# Patient Record
Sex: FEMALE | Race: WHITE | NOT HISPANIC OR LATINO | URBAN - METROPOLITAN AREA
[De-identification: names, ages, dates, MRNs, and addresses within clinical notes are randomized per-mention and may not be internally consistent; named-entity substitution may affect disease eponyms.]

---

## 2017-01-12 ENCOUNTER — HOSPITAL ENCOUNTER (OUTPATIENT)
Dept: PHYSICAL MEDICINE AND REHAB | Facility: CLINIC | Age: 67
Discharge: HOME | End: 2017-01-12
Attending: PHYSICIAN ASSISTANT

## 2017-01-12 DIAGNOSIS — M51.26 LUMBAR DISC HERNIATION: ICD-10-CM

## 2017-01-12 DIAGNOSIS — M79.18 MYOFASCIAL PAIN: ICD-10-CM

## 2017-01-12 DIAGNOSIS — M54.50 LUMBALGIA: ICD-10-CM

## 2017-01-12 DIAGNOSIS — G47.9 SLEEP DISTURBANCE: ICD-10-CM

## 2017-01-12 DIAGNOSIS — M54.6 THORACIC BACK PAIN: ICD-10-CM

## 2017-01-12 DIAGNOSIS — M54.16 LUMBAR RADICULITIS: ICD-10-CM

## 2017-01-12 DIAGNOSIS — M54.16 LUMBAR RADICULAR PAIN: ICD-10-CM

## 2017-01-19 ENCOUNTER — HOSPITAL ENCOUNTER (OUTPATIENT)
Dept: PHYSICAL MEDICINE AND REHAB | Facility: CLINIC | Age: 67
Discharge: HOME | End: 2017-01-19
Attending: PHYSICIAN ASSISTANT

## 2017-01-19 DIAGNOSIS — M40.14 OTHER SECONDARY KYPHOSIS, THORACIC REGION: ICD-10-CM

## 2017-01-19 DIAGNOSIS — M99.03 LUMBOSACRAL DYSFUNCTION: ICD-10-CM

## 2017-01-19 DIAGNOSIS — M51.26 LUMBAR DISC HERNIATION: ICD-10-CM

## 2017-01-19 DIAGNOSIS — M99.02 THORACIC SEGMENT DYSFUNCTION: ICD-10-CM

## 2017-01-19 DIAGNOSIS — M79.18 MYOFASCIAL PAIN: ICD-10-CM

## 2017-01-24 ENCOUNTER — HOSPITAL ENCOUNTER (OUTPATIENT)
Dept: PHYSICAL MEDICINE AND REHAB | Facility: CLINIC | Age: 67
Discharge: HOME | End: 2017-01-24
Attending: PHYSICIAN ASSISTANT

## 2017-01-24 DIAGNOSIS — M99.03 LUMBOSACRAL DYSFUNCTION: ICD-10-CM

## 2017-01-24 DIAGNOSIS — M40.14 OTHER SECONDARY KYPHOSIS, THORACIC REGION: ICD-10-CM

## 2017-01-24 DIAGNOSIS — M79.18 MYOFASCIAL PAIN: ICD-10-CM

## 2017-01-24 DIAGNOSIS — M99.02 THORACIC SEGMENT DYSFUNCTION: ICD-10-CM

## 2017-01-31 ENCOUNTER — HOSPITAL ENCOUNTER (OUTPATIENT)
Dept: PHYSICAL MEDICINE AND REHAB | Facility: CLINIC | Age: 67
Discharge: HOME | End: 2017-01-31
Attending: PHYSICIAN ASSISTANT

## 2017-01-31 DIAGNOSIS — M40.14 OTHER SECONDARY KYPHOSIS, THORACIC REGION: ICD-10-CM

## 2017-01-31 DIAGNOSIS — M79.18 MYOFASCIAL PAIN: ICD-10-CM

## 2017-01-31 DIAGNOSIS — M99.02 THORACIC SEGMENT DYSFUNCTION: ICD-10-CM

## 2017-01-31 DIAGNOSIS — M99.03 LUMBOSACRAL DYSFUNCTION: ICD-10-CM

## 2017-01-31 ASSESSMENT — MIFFLIN-ST. JEOR: SCORE: 1164.99

## 2017-02-09 ENCOUNTER — HOSPITAL ENCOUNTER (OUTPATIENT)
Dept: PHYSICAL MEDICINE AND REHAB | Facility: CLINIC | Age: 67
Discharge: HOME | End: 2017-02-09
Attending: PHYSICIAN ASSISTANT

## 2017-02-09 DIAGNOSIS — M99.02 THORACIC SEGMENT DYSFUNCTION: ICD-10-CM

## 2017-02-09 DIAGNOSIS — M99.03 LUMBOSACRAL DYSFUNCTION: ICD-10-CM

## 2017-02-09 DIAGNOSIS — M79.18 MYOFASCIAL PAIN: ICD-10-CM

## 2017-02-09 DIAGNOSIS — M41.9 SCOLIOSIS: ICD-10-CM

## 2017-02-16 ENCOUNTER — HOSPITAL ENCOUNTER (OUTPATIENT)
Dept: PHYSICAL MEDICINE AND REHAB | Facility: CLINIC | Age: 67
Discharge: HOME | End: 2017-02-16
Attending: PHYSICIAN ASSISTANT

## 2017-02-16 DIAGNOSIS — M99.02 THORACIC SEGMENT DYSFUNCTION: ICD-10-CM

## 2017-02-16 DIAGNOSIS — M99.03 LUMBOSACRAL DYSFUNCTION: ICD-10-CM

## 2017-02-16 DIAGNOSIS — M79.18 MYOFASCIAL PAIN: ICD-10-CM

## 2017-02-16 DIAGNOSIS — M41.9 SCOLIOSIS: ICD-10-CM

## 2017-02-23 ENCOUNTER — HOSPITAL ENCOUNTER (OUTPATIENT)
Dept: PHYSICAL MEDICINE AND REHAB | Facility: CLINIC | Age: 67
Discharge: HOME | End: 2017-02-23
Attending: PHYSICIAN ASSISTANT

## 2017-02-23 DIAGNOSIS — M99.02 THORACIC SEGMENT DYSFUNCTION: ICD-10-CM

## 2017-02-23 DIAGNOSIS — M99.03 LUMBOSACRAL DYSFUNCTION: ICD-10-CM

## 2017-02-23 DIAGNOSIS — M41.9 SCOLIOSIS: ICD-10-CM

## 2017-03-02 ENCOUNTER — HOSPITAL ENCOUNTER (OUTPATIENT)
Dept: PHYSICAL MEDICINE AND REHAB | Facility: CLINIC | Age: 67
Discharge: HOME | End: 2017-03-02
Attending: PHYSICIAN ASSISTANT

## 2017-03-02 DIAGNOSIS — M99.03 LUMBOSACRAL DYSFUNCTION: ICD-10-CM

## 2017-03-02 DIAGNOSIS — M41.9 SCOLIOSIS: ICD-10-CM

## 2017-03-02 ASSESSMENT — MIFFLIN-ST. JEOR: SCORE: 1209.9

## 2017-03-24 ENCOUNTER — COMMUNICATION - HEALTHEAST (OUTPATIENT)
Dept: PHYSICAL MEDICINE AND REHAB | Facility: CLINIC | Age: 67
End: 2017-03-24

## 2017-03-24 DIAGNOSIS — M79.18 MYOFASCIAL PAIN: ICD-10-CM

## 2017-03-24 DIAGNOSIS — M54.16 LUMBAR RADICULITIS: ICD-10-CM

## 2017-03-24 DIAGNOSIS — M54.50 LUMBALGIA: ICD-10-CM

## 2017-03-24 DIAGNOSIS — M54.16 LUMBAR RADICULAR PAIN: ICD-10-CM

## 2017-03-24 DIAGNOSIS — M51.26 LUMBAR DISC HERNIATION: ICD-10-CM

## 2017-03-24 DIAGNOSIS — G47.9 SLEEP DISTURBANCE: ICD-10-CM

## 2017-04-03 ENCOUNTER — COMMUNICATION - HEALTHEAST (OUTPATIENT)
Dept: PHYSICAL MEDICINE AND REHAB | Facility: CLINIC | Age: 67
End: 2017-04-03

## 2017-04-03 DIAGNOSIS — M54.16 LUMBAR RADICULITIS: ICD-10-CM

## 2017-04-05 ENCOUNTER — HOSPITAL ENCOUNTER (OUTPATIENT)
Dept: PHYSICAL MEDICINE AND REHAB | Facility: CLINIC | Age: 67
Discharge: HOME | End: 2017-04-05
Attending: PHYSICIAN ASSISTANT

## 2017-04-05 DIAGNOSIS — M54.16 LUMBAR RADICULITIS: ICD-10-CM

## 2017-04-20 ENCOUNTER — HOSPITAL ENCOUNTER (OUTPATIENT)
Dept: PHYSICAL MEDICINE AND REHAB | Facility: CLINIC | Age: 67
Discharge: HOME | End: 2017-04-20
Attending: PHYSICIAN ASSISTANT

## 2017-04-20 ENCOUNTER — AMBULATORY - HEALTHEAST (OUTPATIENT)
Dept: PHYSICAL MEDICINE AND REHAB | Facility: CLINIC | Age: 67
End: 2017-04-20

## 2017-04-20 DIAGNOSIS — M54.50 LUMBALGIA: ICD-10-CM

## 2017-04-20 DIAGNOSIS — M54.6 THORACIC BACK PAIN: ICD-10-CM

## 2017-04-20 DIAGNOSIS — M51.26 LUMBAR DISC HERNIATION: ICD-10-CM

## 2017-04-20 DIAGNOSIS — M54.16 LUMBAR RADICULITIS: ICD-10-CM

## 2017-04-20 DIAGNOSIS — M79.18 MYOFASCIAL PAIN: ICD-10-CM

## 2017-04-21 ENCOUNTER — COMMUNICATION - HEALTHEAST (OUTPATIENT)
Dept: NEUROSURGERY | Facility: CLINIC | Age: 67
End: 2017-04-21

## 2019-03-20 ENCOUNTER — RECORDS - HEALTHEAST (OUTPATIENT)
Dept: ADMINISTRATIVE | Facility: OTHER | Age: 69
End: 2019-03-20

## 2020-06-03 ENCOUNTER — APPOINTMENT (OUTPATIENT)
Age: 70
Setting detail: DERMATOLOGY
End: 2020-06-05

## 2020-06-03 VITALS — RESPIRATION RATE: 18 BRPM | HEIGHT: 62 IN | WEIGHT: 130 LBS

## 2020-06-03 DIAGNOSIS — R21 RASH AND OTHER NONSPECIFIC SKIN ERUPTION: ICD-10-CM

## 2020-06-03 PROCEDURE — OTHER PRESCRIPTION: OTHER

## 2020-06-03 PROCEDURE — OTHER COUNSELING: OTHER

## 2020-06-03 PROCEDURE — 99202 OFFICE O/P NEW SF 15 MIN: CPT

## 2020-06-03 RX ORDER — KETOCONAZOLE 20 MG/ML
2% SHAMPOO TOPICAL
Qty: 1 | Refills: 1 | Status: ERX | COMMUNITY
Start: 2020-06-03

## 2020-06-03 RX ORDER — DESONIDE 0.5 MG/G
.05% CREAM TOPICAL BID
Qty: 2 | Refills: 1 | Status: ERX | COMMUNITY
Start: 2020-06-03

## 2020-06-08 ENCOUNTER — RX ONLY (RX ONLY)
Age: 70
End: 2020-06-08

## 2020-06-08 RX ORDER — HYDROCORTISONE 25 MG/G
2.5% CREAM TOPICAL TWICE A DAY
Qty: 1 | Refills: 0 | Status: ERX | COMMUNITY
Start: 2020-06-08

## 2021-05-30 VITALS — WEIGHT: 131 LBS | BODY MASS INDEX: 19.92 KG/M2

## 2021-05-30 VITALS — BODY MASS INDEX: 19.92 KG/M2 | WEIGHT: 131 LBS

## 2021-05-30 VITALS — BODY MASS INDEX: 19.8 KG/M2 | WEIGHT: 130.2 LBS

## 2021-05-30 VITALS — BODY MASS INDEX: 19.76 KG/M2 | HEIGHT: 68 IN | WEIGHT: 130.4 LBS

## 2021-05-30 VITALS — WEIGHT: 130 LBS | BODY MASS INDEX: 19.77 KG/M2

## 2021-05-30 VITALS — BODY MASS INDEX: 19.77 KG/M2 | WEIGHT: 130 LBS

## 2021-05-30 VITALS — BODY MASS INDEX: 21.26 KG/M2 | WEIGHT: 140.3 LBS | HEIGHT: 68 IN

## 2021-05-30 VITALS — WEIGHT: 140 LBS | BODY MASS INDEX: 21.29 KG/M2

## 2021-06-08 NOTE — PROGRESS NOTES
SUBJECTIVE:   Ida Hopkins is a 66 y.o. female [facility]. Patient has 5/10 constant achy thoracic and lumbar pain  without leg pain, today.  Patient has had a right L5-S1 TFESI for right lumbar radiculopathy which she does no longer have.  Now her pain is more center in her right side of her thoracic spine.  He is a patient of EPI Dowd, MPA.      PROCEDURE:  66 y.o. female with thoracic and lumbosacral somatic dysfunctions.      PRE-PROCEDURE DIAGNOSIS: Myofascial pain, thoracic and lumbar pain, kyphosis      PROCEDURE PERFORMED: joint manipulations.      Brief Procedure: The patient understands the risks and benefits of spinal manipulations      Procedure:  The patient has pain at thoracic and lumbosacral region.  TE 6, T8, T9, T10, L1, L3, L5, and right SI joint malrotated which are determined by static and motion palpations. Patient was placed on table prone. The thoracic and lumbosacral were manipulated by mechanical instrument and F/D table. X-Y axial distraction of L1-L2 and L2-L3 using category 2. The patient tolerated the manipulations, distraction without any complications.    DIAGNOSIS:  Diagnoses and all orders for this visit:    Myofascial pain    Thoracic segment dysfunction    Lumbosacral dysfunction    Lumbar disc herniation    Other secondary kyphosis, thoracic region        DISCUSSION/PLAN:  Patient condition is unchanged. Patient has myofascial pain, thoracic and lumbar pain, kyphosis. 3-4 regions were manipulated. Distracted L1-L2 and L2-L3 levels.  I recommend the patient to a 2-3 weeks trial of spinal manipulation.    Plan:    Home instructions: ice QID for 10-15 minutes.    Patient will follow up next week for spinal manipulations.    Frequency: 2x weeks for 2-3 weeks        Ihsan Giron DC, MSPAS, PA-C

## 2021-06-08 NOTE — PROGRESS NOTES
Assessment / Plan:     Diagnoses and all orders for this visit:    Lumbar radicular pain    Lumbalgia    Lumbar radiculitis    Lumbar disc herniation    Myofascial pain    Sleep disturbance    Thoracic back pain          Ida Hopkins is a 66 y.o. y.o. female with past medical history significant for end-stage renal disease, restless leg syndrome who presents today for follow-up regarding chronic mid low back pain radiating to the right lateral thigh, right lateral calf and the right ankle.  Patient reports significant improvement in her symptoms after receiving right L5-S1 transforaminal epidural steroid injection on December 15, 2016.  She is to report right Myofacial pain at the right thoracic/lumbar region.  Recommend patient to follow-up with Dr. Giron for Ts.     A shared decision making plan was used.  The patient's values and choices were respected.  The following represents what was discussed and decided upon by the physician and the patient.      1.  DIAGNOSTIC TESTS:  No imaging to review today.  2.  PHYSICAL THERAPY: Patient will continue on physical therapy.  3.  MEDICATIONS:  She will continue on gabapentin.    4.  INTERVENTIONS:  No therapeutic injections at this time.    5.  PATIENT EDUCATION:  I thoroughly discussed the plan with the patient today.  She verbalizes understanding and agrees with the plan.  6.  FOLLOW-UP: I'll see her back in 7-8 weeks or sooner if needed.  All questions were answered.    Subjective:     Ida Hopkins is a 66 y.o. female who presents today for follow-up regarding chronic mid low back pain radiating to the right lateral thigh, right lateral calf and right ankle.  Today patient reports significant improvement in her symptoms after receiving right L5-S1 transforaminal epidural steroid injection on December 15, 2016.  She still reports right upper lumbar/thoracic pain.  She has been taking gabapentin 300 mg, 1 tablet 3 times a day.  She hasn't been doing physical  exercises for the lower back.Patient denies urinary or bowel incontinence, unintentional weight loss, saddle anesthesia, fever or chills, balance difficulties.    Review of Systems:  Right-sided low back pain. Patient denies urinary or bowel incontinence, unintentional weight loss, saddle anesthesia, fever or chills, balance difficulties.       Objective:   CONSTITUTIONAL:  Vital signs as above.  No acute distress.  The patient is well nourished and well groomed.    PSYCHIATRIC:  The patient is awake, alert, oriented to person, place and time.  The patient is answering questions appropriately with clear speech.  Normal affect.  SKIN:  Skin over the face, posterior torso, bilateral upper and lower extremities is clean, dry, intact without rashes.  MUSCULOSKELETAL:  Gait is non-antalgic.  The patient is able to heel and toe walk without any difficulty.  Mild tenderness over the right T12, L1, L2 lumbar paraspinal muscles.      The patient has 5/5 strength for the bilateral hip flexors, knee flexors/extensors, ankle dorsiflexors/plantar flexors, ankle evertors/invertors.    NEUROLOGICAL:  2/4 patellar, medial hamstring, achilles reflexes which are symmetric bilaterally.  No ankle clonus bilaterally.  Sensation to light touch is intact in the bilateral L4, L5, and S1 dermatomes.       RESULTS:    Number MRI done at [facility] of neurology:      lumbar MRI done on July 21 of 2016 that shows at L5-S1 prominent right neural foraminal stenosis from a combination of vertical foraminal height loss and inferior foraminal disc distention with flattening of the exiting right L5 nerve roots. At the L4-L5 circumferential disc bulging with moderate size central disc protrusion, identing the ventral thecal sac. Septal superior endplate compression deformity of the L1 vertebrae, without appreciable vertebral height loss or retropulsion.

## 2021-06-08 NOTE — PROGRESS NOTES
SUBJECTIVE:   Ida Hopkins is a 66 y.o. female is [facility]. Patient has no upper and lower back pain, today.  She reports that she feels a little soreness in her back.  She reports that she cannot come twice a week.    PROCEDURE:  66 y.o. female with thoracic and lumbosacral somatic dysfunctions.      PRE-PROCEDURE DIAGNOSIS: Myofascial pain, thoracic and lumbar pain, kyphosis      PROCEDURE PERFORMED: joint manipulations.      Brief Procedure: The patient understands the risks and benefits of spinal manipulations      Procedure:  The patient has pain at thoracic and lumbosacral region.  T3, T6, T9, T10, L2, L4, L5, bilateral sacral base and right SI joint malrotated which are determined by static and motion palpations. Patient was placed on table prone. The thoracic and lumbosacral were manipulated by mechanical instrument and F/D table. X-Y axial distraction of L1-L2 and L2-L3 using category 2. The patient tolerated the manipulations, distraction without any complications.    DIAGNOSIS:  Diagnoses and all orders for this visit:    Myofascial pain    Thoracic segment dysfunction    Lumbosacral dysfunction    Other secondary kyphosis, thoracic region      DISCUSSION/PLAN:  Patient condition is improving. Patient has myofascial pain, thoracic and lumbar pain, kyphosis. 3-4 regions were manipulated. Distracted L1-L2 and L2-L3 levels.  She will come once a week due to scheduling.    Plan:    Home instructions: ice QID for 10-15 minutes.    Patient will follow up next week for spinal manipulations.    Frequency: 5 more appointments        Ihsan Giron DC, MSPAS, PA-C

## 2021-06-09 NOTE — PROGRESS NOTES
Assessment / Plan:     Diagnoses and all orders for this visit:    Scoliosis    Lumbosacral dysfunction          Ida Hopkins is a 66 y.o. y.o. female with past medical history significant for end-stage renal disease, restless leg syndrome who presents today for follow-up regarding chronic mid low back pain radiating to the right lateral thigh, right lateral calf and the right ankle.  The patient stated that she still has no low back pain however she noticed reappearance of anterior right shin pain after stopping gabapentin for the last 2 weeks.  Patient was concerned about dizziness as side effects of gabapentin.  Patient is gone through an extensive workup at he Neurologist and Cardiologist for her dizziness.  I encouraged patient to restart gabapentin in 2 weeks from now if she does not feel any change in her dizziness to try to help with the right anterior shin pain.  Patient verbalizes understanding and agrees with the plan.  She will follow up with Dr. Giron if needed    A shared decision making plan was used.  The patient's values and choices were respected.  The following represents what was discussed and decided upon by the physician and the patient.      1.  DIAGNOSTIC TESTS:  No imaging to review today.  2.  PHYSICAL THERAPY: She'll continue on physical therapy.  3.  MEDICATIONS:  She will restart gabapentin in 2 weeks if she does not feel any change in her dizziness symptoms.    4.  INTERVENTIONS:  Patient will contact us if she noticed reappearance of the low back pain and radicular pain in the right lower extremity and if she needs to repeat the therapeutic surgery injection.    5.  PATIENT EDUCATION:  I thoroughly discussed the plan with the patient today.  She verbalizes understanding and agrees with the plan.  6.  FOLLOW-UP: She'll follow up with me on an as-needed basis.  All questions were answered.    Subjective:     Ida Hopkins is a 66 y.o. female who presents today for follow-up  regarding chronic mid low back pain radiating to the right lateral thigh, right lateral calf and right ankle.  Today patient stated that she has been doing very well without radicular pain to the lower extremity hole until she stopped taking gabapentin 2 weeks ago where she noticed reappearance of anterior shin pain.  She stated that she has been having difficulty with dizziness.  She has been following up with her neurologist and cardiologist for this issue.  She stated that she stop taking gabapentin 2 weeks ago without any change in her dizziness.  She believes that is not related to taking gabapentin.  Today she reports 0 out of 10 intensity pain of the lower back again with minimal pain at the anterior right shin.  Her symptoms are aggravated by walking for long distances, working at home and rates it at 4 out of 10 intensity on its worst.  Patient stated that she takes Tylenol 500 mg 1-2 tablets when necessary for pain not every day.  She's not taking Celebrex anymore.aku      Review of Systems:  History of low back pain.  Minimal pain at the right anterior shin.Patient denies urinary or bowel incontinence, unintentional weight loss, saddle anesthesia, fever or chills, balance difficulties.       Objective:   CONSTITUTIONAL:  Vital signs as above.  No acute distress.  The patient is well nourished and well groomed.    PSYCHIATRIC:  The patient is awake, alert, oriented to person, place and time.  The patient is answering questions appropriately with clear speech.  Normal affect.  SKIN:  Skin over the face, posterior torso, bilateral upper and lower extremities is clean, dry, intact without rashes.  MUSCULOSKELETAL:  Gait is non-antalgic.  The patient is able to heel and toe walk without any difficulty.  No  tenderness over the L4-L5 lumbar paraspinal muscles.      The patient has 5/5 strength for the bilateral hip flexors, knee flexors/extensors, ankle dorsiflexors/plantar flexors, ankle evertors/invertors.     NEUROLOGICAL:  2/4 patellar, medial hamstring, achilles reflexes which are symmetric bilaterally.  No ankle clonus bilaterally.  Sensation to light touch is intact in the bilateral L4, L5, and S1 dermatomes.       RESULTS:      Number MRI done at [facility] of neurology:      lumbar MRI done on July 21 of 2016 that shows at L5-S1 prominent right neural foraminal stenosis from a combination of vertical foraminal height loss and inferior foraminal disc distention with flattening of the exiting right L5 nerve roots. At the L4-L5 circumferential disc bulging with moderate size central disc protrusion, identing the ventral thecal sac. Septal superior endplate compression deformity of the L1 vertebrae, without appreciable vertebral height loss or retropulsion.

## 2021-06-09 NOTE — PROGRESS NOTES
DISCUSSION/PLAN:  This is a 65 y/o who [facility].  She has slight scoliosis that was causing some pain in her thoracic and lumbar.  Patient has improved with spinal manipulation where she does not have any pain in her back.  Patient can progress to as-needed basis if she has any pain in the future. 3-4  regions were manipulated. Distracted L1-L2 and L2-L3 levels.    Home instructions: heat QID for 10-15 minutes.    Patient will follow up with EPI Neil, NEVILLE.    Frequency: Spinal manipulation as needed basis    Ihsan Giron DC, ANIBAL, PA-C        SUBJECTIVE:   Ida Hopkins is a 66 y.o. female is [facility]. Patient has no thoracic or lumbar pain, today.  This is her last spinal manipulation.  She states that she feels better since the initial spinal manipulation.  She does have some slight pain intermittently but is is better than for she start the treatment.  Pain is better with standing, walking and sitting.  She does not have any radicular leg pain.  She is scheduled to see EPI Dowd, NEVILLE.     OBJECTIVE:  Visit Vitals     BP 97/60     Pulse 87     Wt 131 lb (59.4 kg)     SpO2 94%     BMI 19.92 kg/m2     Physical Exam   Constitutional: She appears well-developed.   HENT:   Head: Atraumatic.   Eyes: Conjunctivae are normal.   Pulmonary/Chest: Effort normal.   Musculoskeletal:        Thoracic back: She exhibits deformity.        Lumbar back: She exhibits no pain and no spasm.   Neurological: She is alert.   Skin: Skin is warm and dry.   Psychiatric: She has a normal mood and affect.     DIAGNOSIS:  Diagnoses and all orders for this visit:    Scoliosis    Lumbosacral dysfunction    Thoracic segment dysfunction        PROCEDURE:  66 y.o. female with thoracic and lumbosacral somatic dysfunctions.      PRE-PROCEDURE DIAGNOSIS: scoliosis      PROCEDURE PERFORMED: joint manipulations.      Brief Procedure: The patient  understands the risks and benefits of spinal manipulations      Procedure:  The patient has no pain at thoracic and lumbosacral region.  T4, T6, T8, T9, T10, T12, L2, L3, L5 and right SI joint malrotated which are determined by static and motion palpations. Patient was placed on table prone. The thoracic and lumbosacral were manipulated by mechanical instrument and F/D table. X-Y axial distraction of L1-L2 and L2-L3 using category 2. The patient tolerated the manipulations, distraction without any complications.

## 2021-06-10 NOTE — PROGRESS NOTES
Assessment / Plan:     Diagnoses and all orders for this visit:    Lumbalgia  -     Ambulatory referral to Neurosurgery  -     traMADol (ULTRAM) 50 mg tablet; Take 1 tablet (50 mg total) by mouth every 6 (six) hours as needed for pain.  Dispense: 18 tablet; Refill: 0  -     celecoxib (CELEBREX) 100 MG capsule; Take 1 capsule (100 mg total) by mouth 2 (two) times a day.  Dispense: 60 capsule; Refill: 0    Lumbar radiculitis  -     Ambulatory referral to Neurosurgery  -     traMADol (ULTRAM) 50 mg tablet; Take 1 tablet (50 mg total) by mouth every 6 (six) hours as needed for pain.  Dispense: 18 tablet; Refill: 0  -     celecoxib (CELEBREX) 100 MG capsule; Take 1 capsule (100 mg total) by mouth 2 (two) times a day.  Dispense: 60 capsule; Refill: 0    Lumbar disc herniation  -     Ambulatory referral to Neurosurgery  -     traMADol (ULTRAM) 50 mg tablet; Take 1 tablet (50 mg total) by mouth every 6 (six) hours as needed for pain.  Dispense: 18 tablet; Refill: 0  -     celecoxib (CELEBREX) 100 MG capsule; Take 1 capsule (100 mg total) by mouth 2 (two) times a day.  Dispense: 60 capsule; Refill: 0    Thoracic back pain  -     Ambulatory referral to Neurosurgery  -     traMADol (ULTRAM) 50 mg tablet; Take 1 tablet (50 mg total) by mouth every 6 (six) hours as needed for pain.  Dispense: 18 tablet; Refill: 0  -     celecoxib (CELEBREX) 100 MG capsule; Take 1 capsule (100 mg total) by mouth 2 (two) times a day.  Dispense: 60 capsule; Refill: 0    Myofascial pain  -     Ambulatory referral to Neurosurgery  -     traMADol (ULTRAM) 50 mg tablet; Take 1 tablet (50 mg total) by mouth every 6 (six) hours as needed for pain.  Dispense: 18 tablet; Refill: 0  -     celecoxib (CELEBREX) 100 MG capsule; Take 1 capsule (100 mg total) by mouth 2 (two) times a day.  Dispense: 60 capsule; Refill: 0          Ida Hopkins is a 66 y.o. y.o. female with past medical history significant for end-stage renal disease, restless leg syndrome who  presents today for follow-up regarding chronic mid low back pain radiating to the right lateral thigh, right lateral calf and the right ankle.  Patient failed repeat right L5-S1 transforaminal epidural steroid injection.  She still complaining of significant nuclear pain to the right lower extremity.  Lumbar MRI done back in July 2016 showed right neural foraminal stenosis at the L5-S1 due to vertical foraminal height loss and inferior foraminal disc distention with flattening of the exiting right L5 nerve root.  I recommend patient surgical consultation with neurosurgery.  I prescribed tramadol 50 mg to be taken as needed for severe pain.  Celebrex 100 mg 1 tablet twice daily for mild to moderate pain.  I recommend patient increase gabapentin 100 mg into 2 tablets 3 times daily.    A shared decision making plan was used.  The patient's values and choices were respected.  The following represents what was discussed and decided upon by the physician and the patient.      1.  DIAGNOSTIC TESTS: I reviewed the lumbar MRI imaging and the report with the patient today.  He verbalizes understanding.  2.  PHYSICAL THERAPY: Patient will continue on physical therapy [facility] program.  3.  MEDICATIONS: Patient will continue on gabapentin 100 mg and increase into 2 tablets 3 times a day.  Tramadol 50 mg to be taken for severe pain, 15 tablets.  No refills.   verified.  Celebrex 100 mg ID.  Side effects of the medication discussed with the patient today.    4.  INTERVENTIONS: No additional therapeutic steroid injection at this time.    5.  PATIENT EDUCATION: I thoroughly discussed the plan with the patient today.  She thank you verbalizes understanding and agrees with the plan.  6.  FOLLOW-UP: Patient will follow up with me in 6  weeks.  All questions were answered.      EPI Dowd, MPA-C      Subjective:     Ida Hopkins is a 66 y.o. female who presents today for follow-up regarding mid low back pain radiating to the  right lateral thigh, right lateral calf and right ankle.  The patient returns to clinic reporting ongoing right-sided low back pain radiating to the right lower extremity despite undergoing right L5-S1 transforaminal epidural steroid injection on April 15, 2017.  Patient started again to take gabapentin 100 mg 1 tablet 3 times a day without any pain relief.  She found the left over from Celebrex 100 mg and start taking that without any pain relief.  Today she rates her pain at 8 out of 10 intensity.  Symptoms are aggravated by standing, walking, sitting and rates it at 8-9 out of 10 intensity on its worst.  Her symptoms are mildly alleviated by taking pain medication and rates it 4-5 out of 10 intensity on its best.  Her lumbar MRI done back in July 2016 showed a at the L5-S1 prominent right neural foraminal stenosis from a combination of vertical foraminal height loss and inferior foraminal disc distention with flattening of the exiting right L5 nerve root.Patient denies urinary or bowel incontinence, unintentional weight loss, saddle anesthesia, fever or chills, balance difficulties.      Review of Systems:  Right-sided low back pain radiating to the right lower extremity.Patient denies urinary or bowel incontinence, unintentional weight loss, saddle anesthesia, fever or chills, balance difficulties.       Objective:   CONSTITUTIONAL:  Vital signs as above.  No acute distress.  The patient is well nourished and well groomed.    PSYCHIATRIC:  The patient is awake, alert, oriented to person, place and time.  The patient is answering questions appropriately with clear speech.  Normal affect.  SKIN:  Skin over the face, posterior torso, bilateral upper and lower extremities is clean, dry, intact without rashes.  MUSCULOSKELETAL:  Gait is non-antalgic.  The patient is able to heel and toe walk without any difficulty.  Mild tenderness over the right L4-L5 L5-S1 lumbar paraspinal muscles.      The patient has 5/5  strength for the bilateral hip flexors, knee flexors/extensors, ankle dorsiflexors/plantar flexors, ankle evertors/invertors.    NEUROLOGICAL:  2/4 patellar, medial hamstring, achilles reflexes which are symmetric bilaterally.  No ankle clonus bilaterally.  Sensation to light touch is intact in the bilateral L4, L5, and S1 dermatomes.       RESULTS:      Number MRI done at [facility] of neurology:      lumbar MRI done on July 21 of 2016 that shows at L5-S1 prominent right neural foraminal stenosis from a combination of vertical foraminal height loss and inferior foraminal disc distention with flattening of the exiting right L5 nerve roots. At the L4-L5 circumferential disc bulging with moderate size central disc protrusion, identing the ventral thecal sac. Septal superior endplate compression deformity of the L1 vertebrae, without appreciable vertebral height loss or retropulsion.

## 2021-06-25 NOTE — PROGRESS NOTES
Progress Notes by Ihsan Giron PA-C at 2/9/2017 11:18 AM     Author: Ihsan Giron PA-C Service: -- Author Type: Physician Assistant    Filed: 2/16/2017 12:02 PM Date of Service: 2/9/2017 11:18 AM Status: Addendum    : Ihsan Giron PA-C (Physician Assistant)    Related Notes: Original Note by Ihsan Giron PA-C (Physician Assistant) filed at 2/9/2017 11:57 AM       DISCUSSION/PLAN:  This is a 65 y/o who [facility]. She has scoliosis thoracic causing some right sided thoracic and lumbar pain. She is setback of soreness in her upper and lower back.  3-4  regions were manipulated. Distracted L1-L2 and L2-L3 levels.    Home instructions: ice QID for 10-15 minutes.    Patient will follow up next week for spinal manipulations.    Frequency: 4 more spinal manipulatons    Ihsan Giron DC, YUKO BARNETT        SUBJECTIVE:   Ida Hopkins is a 66 y.o. female is [facility]. Patient has 2/10 soreness of the upper and lower back after the last spinal manipulation.  She tried ice the upper and lower back without any relief.    OBJECTIVE:  Visit Vitals   ? /61   ? Pulse 88   ? Temp 97.9  F (36.6  C) (Oral)   ? Resp 17   ? Wt 131 lb (59.4 kg)   ? SpO2 96%   ? BMI 19.92 kg/m2     Physical Exam   Constitutional: She appears well-developed.   HENT:   Head: Atraumatic.   Eyes: Conjunctivae are normal.   Pulmonary/Chest: Effort normal.   Musculoskeletal:        Thoracic back: She exhibits deformity.        Back:    Neurological: She is alert.   Skin: Skin is warm and dry.   Psychiatric: She has a normal mood and affect.     DIAGNOSIS:  Diagnoses and all orders for this visit:    Myofascial pain    Thoracic segment dysfunction    Lumbosacral dysfunction    Scoliosis        PROCEDURE:  66 y.o. female with thoracic and lumbosacral somatic dysfunctions.      PRE-PROCEDURE DIAGNOSIS: Myofascial pain, thoracic and lumbar  pain, scoliosis      PROCEDURE PERFORMED: joint manipulations.      Brief Procedure: The patient understands the risks and benefits of spinal manipulations      Procedure:  The patient has pain at thoracic and lumbosacral region. T4, T6, T8, T10, T11, L1, L2, L5 and right SI joint malrotated which are determined by static and motion palpations. Patient was placed on table prone. The thoracic and lumbosacral were manipulated by mechanical instrument and F/D table. X-Y axial distraction of L1-L2 and L2-L3 using category 2. The patient tolerated the manipulations, distraction without any complications.

## 2021-06-25 NOTE — PROGRESS NOTES
Progress Notes by Ihsan Giron PA-C at 2/16/2017 11:20 AM     Author: Ihsan Giron PA-C Service: -- Author Type: Physician Assistant    Filed: 2/16/2017 12:01 PM Date of Service: 2/16/2017 11:20 AM Status: Signed    : Ihsan Giron PA-C (Physician Assistant)       DISCUSSION/PLAN:  This is a 67 y/o who [facility]. She has chronic thoracic and lumbar pain.  She is very kyphotic at the thoracic spine that can be contributing to her pain and upper back.  Patient is progressing with spinal manipulation and having some relief of her pain in her mid back.  I recommend patient do one more spinal manipulation.  3-4  regions were manipulated. Distracted L1-L2 and L2-L3 levels.    Home instructions: ice QID for 10-15 minutes.    Patient will follow up next week for spinal manipulations.    Frequency: 1 more spinal manipulatons    Ihsan Giron DC, YUKO BARNETT        SUBJECTIVE:   Ida Hopkins is a 66 y.o. female is [facility]. Patient has 2/10 soreness of the upper and lower back, today.  She still has pain with standing and sitting.  She feels that the spinal manipulation is helping her upper and lower back pain.      OBJECTIVE:  Visit Vitals   ? BP 99/61   ? Pulse 92   ? Temp 97.6  F (36.4  C) (Oral)   ? Wt 131 lb (59.4 kg)   ? SpO2 96%   ? BMI 19.92 kg/m2     Physical Exam   Constitutional: She appears well-developed.   HENT:   Head: Atraumatic.   Eyes: Conjunctivae are normal.   Pulmonary/Chest: Effort normal.   Musculoskeletal:        Thoracic back: She exhibits deformity.        Back:    Neurological: She is alert.   Skin: Skin is warm and dry.   Psychiatric: She has a normal mood and affect.     DIAGNOSIS:  Diagnoses and all orders for this visit:    Myofascial pain    Thoracic segment dysfunction    Lumbosacral dysfunction    Scoliosis        PROCEDURE:  66 y.o. female with thoracic and lumbosacral somatic  dysfunctions.      PRE-PROCEDURE DIAGNOSIS: Myofascial pain, thoracic and lumbar pain, scoliosis      PROCEDURE PERFORMED: joint manipulations.      Brief Procedure: The patient understands the risks and benefits of spinal manipulations      Procedure:  The patient has pain at thoracic and lumbosacral region. T4, T6, T7, T10, T11, L1, L2, L3, L5 and right SI joint malrotated which are determined by static and motion palpations. Patient was placed on table prone. The thoracic and lumbosacral were manipulated by mechanical instrument and F/D table. X-Y axial distraction of L1-L2 and L2-L3 using category 2. The patient tolerated the manipulations, distraction without any complications.

## 2021-06-25 NOTE — PROGRESS NOTES
"Progress Notes by Ihsan Giron PA-C at 1/31/2017 11:18 AM     Author: Ihsan Giron PA-C Service: -- Author Type: Physician Assistant    Filed: 2/16/2017 12:03 PM Date of Service: 1/31/2017 11:18 AM Status: Addendum    : Ihsan Giron PA-C (Physician Assistant)    Related Notes: Original Note by Ihsan Giron PA-C (Physician Assistant) filed at 1/31/2017 12:27 PM       DISCUSSION/PLAN:  This is a 65 y/o who [facility]. She has kyphotic thoracic causing some right sided thoracic and lumbar pain. She is doing well with treatments. Her pain is at a manageable level with occasional Tylenol. 3-4  regions were manipulated. Distracted L1-L2 and L2-L3 levels.    Home instructions: ice QID for 10-15 minutes.    Patient will follow up next week for spinal manipulations.    Frequency: 4 more spinal manipulatons    Ihsan Giron DC, YUKO BARNETT        SUBJECTIVE:   Ida Hopkins is a 66 y.o. female is [facility]. Patient has no upper and lower back pain before she came to the clinic.  After waiting for a few minutes to be seen, the patient started having lower thoracic pain. When the patient does have pain, phase on the right T11-T12 paraspinal. Spinal manipulation is helping with her pain. She takes over-the-counter Tylenol for the pain.    OBJECTIVE:  Visit Vitals   ? /67 (Patient Site: Right Arm, Patient Position: Sitting, Cuff Size: Adult Regular)   ? Pulse 93   ? Resp 17   ? Ht 5' 8\" (1.727 m)   ? Wt 130 lb 6.4 oz (59.1 kg)   ? SpO2 94%   ? BMI 19.83 kg/m2     Physical Exam   Constitutional: She appears well-developed.   HENT:   Head: Atraumatic.   Eyes: Conjunctivae are normal.   Pulmonary/Chest: Effort normal.   Musculoskeletal:        Thoracic back: She exhibits deformity.        Back:    Neurological: She is alert.   Skin: Skin is warm and dry.   Psychiatric: She has a normal mood and affect. "     DIAGNOSIS:  Diagnoses and all orders for this visit:    Myofascial pain    Thoracic segment dysfunction    Lumbosacral dysfunction    Other secondary kyphosis, thoracic region        PROCEDURE:  66 y.o. female with thoracic and lumbosacral somatic dysfunctions.      PRE-PROCEDURE DIAGNOSIS: Myofascial pain, thoracic and lumbar pain, kyphosis      PROCEDURE PERFORMED: joint manipulations.      Brief Procedure: The patient understands the risks and benefits of spinal manipulations      Procedure:  The patient has pain at thoracic and lumbosacral region. T3, T5, T7, T8, T10, T11, L1, L5 and right SI joint malrotated which are determined by static and motion palpations. Patient was placed on table prone. The thoracic and lumbosacral were manipulated by mechanical instrument and F/D table. X-Y axial distraction of L1-L2 and L2-L3 using category 2. The patient tolerated the manipulations, distraction without any complications.